# Patient Record
Sex: MALE | Race: WHITE
[De-identification: names, ages, dates, MRNs, and addresses within clinical notes are randomized per-mention and may not be internally consistent; named-entity substitution may affect disease eponyms.]

---

## 2018-01-08 ENCOUNTER — HOSPITAL ENCOUNTER (EMERGENCY)
Dept: HOSPITAL 74 - FER | Age: 38
Discharge: HOME | End: 2018-01-08
Payer: COMMERCIAL

## 2018-01-08 VITALS — BODY MASS INDEX: 29.1 KG/M2

## 2018-01-08 VITALS — TEMPERATURE: 98.2 F | DIASTOLIC BLOOD PRESSURE: 94 MMHG | HEART RATE: 94 BPM | SYSTOLIC BLOOD PRESSURE: 134 MMHG

## 2018-01-08 DIAGNOSIS — M54.5: Primary | ICD-10-CM

## 2018-01-08 DIAGNOSIS — Z87.891: ICD-10-CM

## 2018-01-08 DIAGNOSIS — G89.29: ICD-10-CM

## 2018-01-08 PROCEDURE — 3E0233Z INTRODUCTION OF ANTI-INFLAMMATORY INTO MUSCLE, PERCUTANEOUS APPROACH: ICD-10-PCS | Performed by: EMERGENCY MEDICINE

## 2018-01-08 NOTE — PDOC
History of Present Illness





- General


Chief Complaint: Pain


Stated Complaint: BACK PAIN X 3 DAYS


Time Seen by Provider: 01/08/18 07:14





- History of Present Illness


Initial Comments: 





01/08/18 07:30


pt presents to the ED complaining of a two day history of lower back pain in a 

band like distribution in his lower back.  Pain is constant, worsened by 

walking or by bending to touch his toes, moderate in severity and not relieved 

by ibuprofen.  Denies trauma, fevers, weakness or numbness of his legs or feet.

  Denies problems with bowel or bladder control.  Has a prior history of 

similar pain, but this pain is more severe.  Patient presents to the ED today 

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

  Denies urinary complaints. 





Has LS xray from 12/16 that shows mild degenerative changes and healed 

transverse process fracture of L 2.   


01/08/18 07:33





Severity: moderate


Modifying Factors: improves with: movement


Associated Symptoms: reports: denies symptoms





Past History





- Past Medical History


Allergies/Adverse Reactions: 


 Allergies











Allergy/AdvReac Type Severity Reaction Status Date / Time


 


Penicillins Allergy   Verified 01/08/18 07:11











Home Medications: 


Ambulatory Orders





NK [No Known Home Medication]  09/01/14 








COPD: No


Other medical history: DENIES





- Suicide/Smoking/Psychosocial Hx


Smoking History: Former smoker


Have you smoked in the past 12 months: No


Number of Cigarettes Smoked Daily: 0


If you are a former smoker, when did you quit?: 10 YEARS


Cigars Per Day: 1


Information on smoking cessation initiated: No


'Breaking Loose' booklet given: 09/01/14


Hx Alcohol Use: Yes (OCCAS)


Drug/Substance Use Hx: No


Substance Use Type: None





*Physical Exam





- Vital Signs


 Last Vital Signs











Temp Pulse Resp BP Pulse Ox


 


 98.2 F   94 H  18   134/94   98 


 


 01/08/18 07:12  01/08/18 07:12  01/08/18 07:12  01/08/18 07:12  01/08/18 07:12














- Physical Exam


General Appearance: Yes: Nourished, Appropriately Dressed.  No: Apparent 

Distress, Disheveled, Mild Distress, Moderate Distress, Severe Distress, 

Alcohol on Breath, Intoxicated, Cachetic, Obese, Thin, Other


HEENT: positive: Normal ENT Inspection


Neck: positive: Supple


Respiratory/Chest: positive: Lungs Clear, Normal Breath Sounds


Cardiovascular: positive: Regular Rhythm, Regular Rate, S1, S2


Gastrointestinal/Abdominal: positive: Normal Bowel Sounds, Flat, Soft


Musculoskeletal: negative: Normal Inspection, CVA Tenderness, CVA Tenderness (R)

, CVA Tenderness (L), Decreased Range of Motion, Muscle Spasm, Vertebral 

Tenderness (mild lumbar paraspinal tenderness in a band like distribution in 

the lower back), Other


Integumentary: positive: Normal Color, Dry, Warm


Neurologic: positive: Fully Oriented, Alert, Normal Mood/Affect, Motor Strength 

5/5





Medical Decision Making





- Medical Decision Making





01/08/18 07:35


Pt presents to the ED complaining of atraumatic lower back pain.  No red flags 

to suggest malignancy or cord compression.  Will treat with toradol, discharge 

home with prescription for muscle relaxant. 





*DC/Admit/Observation/Transfer


Diagnosis at time of Disposition: 


Back pain


Qualifiers:


 Back pain location: low back pain Chronicity: acute Back pain laterality: 

bilateral Sciatica presence: without sciatica Qualified Code(s): M54.5 - Low 

back pain








- Discharge Dispostion


Disposition: HOME


Condition at time of disposition: Good


Admit: No





- Referrals


Referrals: 


Gucci Freed MD [Primary Care Provider] - 





- Patient Instructions


Printed Discharge Instructions:  DI for Low Back Pain


Additional Instructions: 


return to the ED for severe pain, numbness and tingling in your legs, loss of 

bowel or bladder control.  Follow up with Dr. Freed this week.  you can take up 

to 4 or the over the counter Ibuprofen pills every 8 hours. 





- Post Discharge Activity


Forms/Work/School Notes:  Back to Work

## 2022-08-16 ENCOUNTER — HOSPITAL ENCOUNTER (EMERGENCY)
Dept: HOSPITAL 74 - FER | Age: 42
Discharge: HOME | End: 2022-08-16
Payer: COMMERCIAL

## 2022-08-16 VITALS
DIASTOLIC BLOOD PRESSURE: 83 MMHG | TEMPERATURE: 98.3 F | HEART RATE: 96 BPM | SYSTOLIC BLOOD PRESSURE: 133 MMHG | RESPIRATION RATE: 18 BRPM

## 2022-08-16 VITALS — BODY MASS INDEX: 28.5 KG/M2

## 2022-08-16 DIAGNOSIS — R19.7: Primary | ICD-10-CM

## 2022-08-16 LAB
ALBUMIN SERPL-MCNC: 3.7 G/DL (ref 3.4–5)
ALP SERPL-CCNC: 185 U/L (ref 45–117)
ALT SERPL-CCNC: 100 U/L (ref 13–61)
ANION GAP SERPL CALC-SCNC: 8 MMOL/L (ref 8–16)
AST SERPL-CCNC: 35 U/L (ref 15–37)
BILIRUB SERPL-MCNC: 0.9 MG/DL (ref 0.2–1)
BUN SERPL-MCNC: 16 MG/DL (ref 7–18)
CALCIUM SERPL-MCNC: 9.1 MG/DL (ref 8.5–10)
CHLORIDE SERPL-SCNC: 99 MMOL/L (ref 98–107)
CO2 SERPL-SCNC: 26 MMOL/L (ref 21–32)
CREAT SERPL-MCNC: 1 MG/DL (ref 0.55–1.3)
DEPRECATED RDW RBC AUTO: 13.9 % (ref 11.9–15.9)
GLUCOSE SERPL-MCNC: 86 MG/DL (ref 74–106)
HCT VFR BLD CALC: 40.9 % (ref 35.4–49)
HGB BLD-MCNC: 14.6 G/DL (ref 11.7–16.9)
INR BLD: 1.17 (ref 0.83–1.09)
MCH RBC QN AUTO: 31.2 PG (ref 25.7–33.7)
MCHC RBC AUTO-ENTMCNC: 35.7 G/DL (ref 32–35.9)
MCV RBC: 87.2 FL (ref 80–96)
PLATELET # BLD AUTO: 318.6 10^3/UL (ref 134–434)
PLATELET BLD QL SMEAR: ADEQUATE
PMV BLD: 8 FL (ref 7.5–11.1)
PROT SERPL-MCNC: 7.6 G/DL (ref 6.4–8.2)
PT PNL PPP: 13.5 SEC (ref 9.7–13)
RBC # BLD AUTO: 4.69 10^6/UL (ref 4–5.6)
SODIUM SERPL-SCNC: 133 MMOL/L (ref 136–145)
WBC # BLD AUTO: 9 10^3/UL (ref 4–10.8)

## 2022-08-16 PROCEDURE — 3E0337Z INTRODUCTION OF ELECTROLYTIC AND WATER BALANCE SUBSTANCE INTO PERIPHERAL VEIN, PERCUTANEOUS APPROACH: ICD-10-PCS

## 2022-08-16 PROCEDURE — 3E03329 INTRODUCTION OF OTHER ANTI-INFECTIVE INTO PERIPHERAL VEIN, PERCUTANEOUS APPROACH: ICD-10-PCS
